# Patient Record
Sex: FEMALE | Race: WHITE | NOT HISPANIC OR LATINO
[De-identification: names, ages, dates, MRNs, and addresses within clinical notes are randomized per-mention and may not be internally consistent; named-entity substitution may affect disease eponyms.]

---

## 2018-11-13 ENCOUNTER — RECORD ABSTRACTING (OUTPATIENT)
Age: 58
End: 2018-11-13

## 2018-11-13 DIAGNOSIS — Z82.0 FAMILY HISTORY OF EPILEPSY AND OTHER DISEASES OF THE NERVOUS SYSTEM: ICD-10-CM

## 2018-11-13 DIAGNOSIS — Z85.3 PERSONAL HISTORY OF MALIGNANT NEOPLASM OF BREAST: ICD-10-CM

## 2018-11-13 DIAGNOSIS — Z82.49 FAMILY HISTORY OF ISCHEMIC HEART DISEASE AND OTHER DISEASES OF THE CIRCULATORY SYSTEM: ICD-10-CM

## 2018-11-13 DIAGNOSIS — Z87.39 PERSONAL HISTORY OF OTHER DISEASES OF THE MUSCULOSKELETAL SYSTEM AND CONNECTIVE TISSUE: ICD-10-CM

## 2018-11-13 DIAGNOSIS — Z84.1 FAMILY HISTORY OF DISORDERS OF KIDNEY AND URETER: ICD-10-CM

## 2018-11-13 DIAGNOSIS — Z87.891 PERSONAL HISTORY OF NICOTINE DEPENDENCE: ICD-10-CM

## 2018-11-13 LAB — CYTOLOGY CVX/VAG DOC THIN PREP: NORMAL

## 2018-11-13 RX ORDER — ASPIRIN 81 MG
600-200 TABLET, DELAYED RELEASE (ENTERIC COATED) ORAL
Refills: 0 | Status: ACTIVE | COMMUNITY

## 2018-11-13 RX ORDER — FEXOFENADINE HCL 60 MG
CAPSULE ORAL
Refills: 0 | Status: ACTIVE | COMMUNITY

## 2018-11-27 ENCOUNTER — APPOINTMENT (OUTPATIENT)
Dept: OBGYN | Facility: CLINIC | Age: 58
End: 2018-11-27

## 2019-01-25 ENCOUNTER — APPOINTMENT (OUTPATIENT)
Dept: OBGYN | Facility: HOSPITAL | Age: 59
End: 2019-01-25

## 2019-02-26 ENCOUNTER — APPOINTMENT (OUTPATIENT)
Dept: OBGYN | Facility: HOSPITAL | Age: 59
End: 2019-02-26

## 2020-05-28 ENCOUNTER — APPOINTMENT (OUTPATIENT)
Dept: NEUROLOGY | Facility: CLINIC | Age: 60
End: 2020-05-28
Payer: COMMERCIAL

## 2020-05-28 DIAGNOSIS — R20.2 PARESTHESIA OF SKIN: ICD-10-CM

## 2020-05-28 PROCEDURE — 99203 OFFICE O/P NEW LOW 30 MIN: CPT | Mod: 95

## 2020-05-28 RX ORDER — TAMOXIFEN CITRATE 10 MG/1
10 TABLET, FILM COATED ORAL
Refills: 0 | Status: DISCONTINUED | COMMUNITY
End: 2020-05-28

## 2020-05-28 NOTE — PHYSICAL EXAM
[General Appearance - Alert] : alert [General Appearance - In No Acute Distress] : in no acute distress [General Appearance - Well-Appearing] : healthy appearing [] : normal voice and communication [Affect] : the affect was normal [Mood] : the mood was normal [Cranial Nerves Oculomotor (III)] : extraocular motion intact [Cranial Nerves Facial (VII)] : face symmetrical [Cranial Nerves Vestibulocochlear (VIII)] : hearing was intact bilaterally [Cranial Nerves Accessory (XI - Cranial And Spinal)] : head turning and shoulder shrug symmetric [Cranial Nerves Hypoglossal (XII)] : there was no tongue deviation with protrusion [Involuntary Movements] : no involuntary movements were seen [Motor Handedness Right-Handed] : the patient is right hand dominant [Abnormal Walk] : normal gait [Paresis Pronator Drift Right-Sided] : no pronator drift on the right [Paresis Pronator Drift Left-Sided] : no pronator drift on the left [FreeTextEntry8] : mild action tremor LUE.

## 2020-05-28 NOTE — HISTORY OF PRESENT ILLNESS
[Home] : at home, [unfilled] , at the time of the visit. [Medical Office: ([facility])___] : at the medical office located in  [Verbal consent obtained from patient] : the patient, [unfilled] [FreeTextEntry1] : Ms Cornell is a 58 y/o right handed woman. She is being evaluated for vertigo. She states that 2 weeks ago she experienced a sudden onset of left facial, shoulder, and arm tingling. There was no weakness or change in her speech. She was able to use her hand normally. This lasted for 2 days and then she returned to normal.\par \par About 4 days ago she began to have episodes of "dizziness". She describes this as a spinning sensation. It would occur episodically and was worse if she turned in bed or moved her head quickly. She also thinks this is worse if she is looking down. She feels no nausea or weakness and her walking is normal.\par She has never had this sensation before.\par \par She has not had any neurologic symptoms before. She was give a prescription for meclizine by her PCP but she has not filled it yet. She notes no change in her hearing and no buzzing or ringing in her ears.

## 2020-05-28 NOTE — ASSESSMENT
[FreeTextEntry1] : 1. Vertigo.\par 2. Paresthesia.\par \par Ms Cornell has had symptoms of left sided tingling and vertigo. I don't think these are related. Her symptom of spinning is typical for vertigo but this should not be related to a transient tingling sensation. She has a slight tremor in her left upper extremity but, otherwise, her limited neurologic exam is normal.\par \par I discussed my findings with her today. Because of her paresthesia I think it is reasonable to obtain a brain MRI and she agrees. I advised her to try taking meclizine as prescribed for her vertigo. I also discussed the use of vestibular therapy and she will consider this if her symptoms persist. I will see her again after the MRI is completed.

## 2020-06-09 DIAGNOSIS — R42 DIZZINESS AND GIDDINESS: ICD-10-CM

## 2020-12-16 ENCOUNTER — APPOINTMENT (OUTPATIENT)
Dept: OBGYN | Facility: CLINIC | Age: 60
End: 2020-12-16
Payer: COMMERCIAL

## 2020-12-16 VITALS
BODY MASS INDEX: 31.49 KG/M2 | SYSTOLIC BLOOD PRESSURE: 126 MMHG | HEIGHT: 65 IN | WEIGHT: 189 LBS | DIASTOLIC BLOOD PRESSURE: 84 MMHG

## 2020-12-16 DIAGNOSIS — Z00.00 ENCOUNTER FOR GENERAL ADULT MEDICAL EXAMINATION W/OUT ABNORMAL FINDINGS: ICD-10-CM

## 2020-12-16 DIAGNOSIS — Z78.0 ASYMPTOMATIC MENOPAUSAL STATE: ICD-10-CM

## 2020-12-16 PROCEDURE — 99396 PREV VISIT EST AGE 40-64: CPT

## 2020-12-16 PROCEDURE — 99072 ADDL SUPL MATRL&STAF TM PHE: CPT

## 2020-12-16 RX ORDER — HYALURONIC ACID, HYDROLYZED 75 TO 100%
POWDER (GRAM) MISCELLANEOUS
Qty: 1 | Refills: 11 | Status: ACTIVE | COMMUNITY
Start: 2020-12-16

## 2020-12-16 NOTE — HISTORY OF PRESENT ILLNESS
[FreeTextEntry1] : 59yo  postmenopausal without HRT, no longer on Tamoxifen here for Gyn exam. Pt referred by Dr. Law. She is S/P bilateral mastectomies (left Ca/right prophylactic) for breast Ca. She had BRCA testing-> NEGATIVE. She had been on Tamoxifen x >10 years-had an endometrial polyp -> hysteroscopic polypectomy-> benign.She still has c/o vaginal dryness and dyspareunia- tried Astroglide and Coconut oil without relief. \par Son had COVID, patient did not. Recently(for the last month) has had diarrhea. Has been improving(happening less often) is awaiting results of another COVID test\par  \par  [Mammogramdate] : Bilateral mastectomies [PapSmeardate] : 4/12/17 [TextBox_31] : Neg/HPV Neg [ColonoscopyDate] : 2015 [TextBox_43] : negative,tortuous colon

## 2020-12-21 LAB
CYTOLOGY CVX/VAG DOC THIN PREP: ABNORMAL
HPV HIGH+LOW RISK DNA PNL CVX: NOT DETECTED

## 2022-11-01 PROBLEM — N95.2 POSTMENOPAUSAL ATROPHIC VAGINITIS: Status: ACTIVE | Noted: 2020-12-16

## 2022-11-01 PROBLEM — M85.88 OSTEOPENIA OF LUMBAR SPINE: Status: ACTIVE | Noted: 2022-11-01

## 2022-11-03 ENCOUNTER — NON-APPOINTMENT (OUTPATIENT)
Age: 62
End: 2022-11-03

## 2022-11-03 ENCOUNTER — APPOINTMENT (OUTPATIENT)
Dept: OBGYN | Facility: CLINIC | Age: 62
End: 2022-11-03

## 2022-11-03 VITALS
DIASTOLIC BLOOD PRESSURE: 70 MMHG | BODY MASS INDEX: 30.99 KG/M2 | SYSTOLIC BLOOD PRESSURE: 124 MMHG | HEIGHT: 65 IN | WEIGHT: 186 LBS

## 2022-11-03 DIAGNOSIS — Z80.3 FAMILY HISTORY OF MALIGNANT NEOPLASM OF BREAST: ICD-10-CM

## 2022-11-03 DIAGNOSIS — M85.88 OTHER SPECIFIED DISORDERS OF BONE DENSITY AND STRUCTURE, OTHER SITE: ICD-10-CM

## 2022-11-03 DIAGNOSIS — N95.2 POSTMENOPAUSAL ATROPHIC VAGINITIS: ICD-10-CM

## 2022-11-03 LAB
BILIRUB UR QL STRIP: NEGATIVE
CLARITY UR: NORMAL
COLLECTION METHOD: NORMAL
GLUCOSE UR-MCNC: NEGATIVE
HCG UR QL: 0.2 EU/DL
HGB UR QL STRIP.AUTO: NEGATIVE
KETONES UR-MCNC: NEGATIVE
LEUKOCYTE ESTERASE UR QL STRIP: NEGATIVE
NITRITE UR QL STRIP: NEGATIVE
PH UR STRIP: 6.5
PROT UR STRIP-MCNC: NEGATIVE
SP GR UR STRIP: 1.02

## 2022-11-03 PROCEDURE — 99396 PREV VISIT EST AGE 40-64: CPT

## 2022-11-03 PROCEDURE — 81003 URINALYSIS AUTO W/O SCOPE: CPT | Mod: QW

## 2022-11-03 NOTE — HISTORY OF PRESENT ILLNESS
[Patient reported colonoscopy was normal] : Patient reported colonoscopy was normal [TextBox_4] :  - 2 NVD's, with a history of carcinoma of the left breast, S/P bilateral mastectomies in 2007.  BRCA mutation negative.  Updated testing was recommended - pt may want to discuss w/ Dr. Roblero, as well. Pt declines referral. Completed 10 years of tamoxifen.  2019 hysteroscopic removal of a benign endometrial polyp.  Atrophic vaginitis not responding adequately to Ismael glide and coconut oil. U/A at PCP's office 4-6 wks ago was neg.\par Good bladder control; voids 1 x/nt.\par Diet & exercise d/w pt who prefers to wait another yr for repeat BD. [TextBox_19] : Bilateral mastectomies [PapSmeardate] : 12/16/2020 [TextBox_31] : neg; HPV neg [BoneDensityDate] : 1/8/2021 [TextBox_37] : T -1.5 at L1-4 representing a 9.1% decrease compared to the prior study on 11/16/2016; T score was not provided for the left hip or left femoral neck.  10-year fracture risk 6.2 and 0.2%. [ColonoscopyDate] : 2013 [TextBox_43] : 2021 neg Peterson. [TextBox_6] : No vasomotor sx's

## 2022-11-03 NOTE — PHYSICAL EXAM
[No Lymphadenopathy] : no lymphadenopathy [Soft] : soft [Non-tender] : non-tender [No HSM] : No HSM [No Mass] : no mass [Nl Sphincter Tone] : normal sphincter tone [FreeTextEntry3] : No thyroid nodules [Right Breast Absent] : a total mastectomy [Breast Reconstruction Right] : breast reconstruction [Left Breast Absent] : a total mastectomy [Breast Reconstruction Left] : breast reconstruction [Vulvar Atrophy] : vulvar atrophy [Labia Majora] : normal [Labia Minora] : normal [Atrophy] : atrophy [Normal] : normal [Normal Position] : in a normal position [Uterine Adnexae] : normal [FreeTextEntry6] : small [FreeTextEntry9] : No masses

## 2023-04-27 ENCOUNTER — APPOINTMENT (OUTPATIENT)
Dept: OBGYN | Facility: CLINIC | Age: 63
End: 2023-04-27
Payer: COMMERCIAL

## 2023-04-27 VITALS
SYSTOLIC BLOOD PRESSURE: 121 MMHG | DIASTOLIC BLOOD PRESSURE: 80 MMHG | WEIGHT: 190 LBS | BODY MASS INDEX: 31.65 KG/M2 | HEIGHT: 65 IN

## 2023-04-27 DIAGNOSIS — R14.0 ABDOMINAL DISTENSION (GASEOUS): ICD-10-CM

## 2023-04-27 PROCEDURE — 76830 TRANSVAGINAL US NON-OB: CPT

## 2023-04-27 PROCEDURE — 99214 OFFICE O/P EST MOD 30 MIN: CPT | Mod: 25

## 2023-04-27 NOTE — REVIEW OF SYSTEMS
[Recent Weight Gain (___ Lbs)] : recent [unfilled] ~Ulb weight gain [Diarrhea] : diarrhea [Heartburn] : heartburn [Bloating] : bloating [Arthralgias] : arthralgias [Negative] : Heme/Lymph 31-Aug-2017

## 2023-04-27 NOTE — HISTORY OF PRESENT ILLNESS
[FreeTextEntry1] : 63yo  postmenopausal without HRT, no longer on Tamoxifen here for Gyn exam. Pt followed by Dr. Law. She is S/P bilateral mastectomies (left Ca/right prophylactic) for breast Ca. She had BRCA testing-> NEGATIVE. She had been on Tamoxifen x >10 years-had an endometrial polyp -> hysteroscopic polypectomy-> benign.She has c/o abdominal bloating. She also has perineal and perianal irritation(has to wipe a lot then feels raw) Is getting  so dyspareunia is not an active problem at this time\par \par  \par \par \par Preventative Visit: \par Mammogram: Bilateral mastectomies. \par PAP Smear: 20  Neg/HPV Neg. \par Bone Density: 21 T Score Spine -1.5 Hip +0.1 Fem Neck -0.7 Forearm -1.9\par Colonoscopy: , negative,tortuous colon. \par

## 2023-11-09 ENCOUNTER — APPOINTMENT (OUTPATIENT)
Dept: OBGYN | Facility: CLINIC | Age: 63
End: 2023-11-09

## 2024-04-11 ENCOUNTER — APPOINTMENT (OUTPATIENT)
Dept: UROLOGY | Facility: CLINIC | Age: 64
End: 2024-04-11
Payer: COMMERCIAL

## 2024-04-11 VITALS
DIASTOLIC BLOOD PRESSURE: 84 MMHG | HEIGHT: 65 IN | HEART RATE: 79 BPM | BODY MASS INDEX: 32.49 KG/M2 | SYSTOLIC BLOOD PRESSURE: 145 MMHG | WEIGHT: 195 LBS

## 2024-04-11 DIAGNOSIS — L98.9 DISORDER OF THE SKIN AND SUBCUTANEOUS TISSUE, UNSPECIFIED: ICD-10-CM

## 2024-04-11 DIAGNOSIS — Z85.3 PERSONAL HISTORY OF MALIGNANT NEOPLASM OF BREAST: ICD-10-CM

## 2024-04-11 DIAGNOSIS — M54.50 LOW BACK PAIN, UNSPECIFIED: ICD-10-CM

## 2024-04-11 PROCEDURE — 99203 OFFICE O/P NEW LOW 30 MIN: CPT

## 2024-04-11 RX ORDER — CLONAZEPAM 0.5 MG/1
0.5 TABLET ORAL
Refills: 0 | Status: DISCONTINUED | COMMUNITY
End: 2024-04-11

## 2024-04-11 NOTE — ASSESSMENT
[FreeTextEntry1] : Patient is a 63-year-old female who has intermittent frequency urgency and suprapubic bloating.  Her PCP has checked her for UTIs and she has come back negative.  She has no obvious modifying factors.  She denies any episodes of gross hematuria or fevers associated with these episodes.  She is a non-smoker.  Assessment and plan 1.  Suprapubic pressure and pain 2.  UTI symptoms Will start by getting a renal ultrasound, cystoscopy with pelvic exam to evaluate.

## 2024-04-26 ENCOUNTER — APPOINTMENT (OUTPATIENT)
Dept: UROLOGY | Facility: CLINIC | Age: 64
End: 2024-04-26
Payer: COMMERCIAL

## 2024-04-26 VITALS
WEIGHT: 190 LBS | BODY MASS INDEX: 31.65 KG/M2 | DIASTOLIC BLOOD PRESSURE: 98 MMHG | SYSTOLIC BLOOD PRESSURE: 171 MMHG | HEIGHT: 65 IN | HEART RATE: 93 BPM

## 2024-04-26 DIAGNOSIS — R39.9 UNSPECIFIED SYMPTOMS AND SIGNS INVOLVING THE GENITOURINARY SYSTEM: ICD-10-CM

## 2024-04-26 PROCEDURE — 52000 CYSTOURETHROSCOPY: CPT

## 2024-05-01 ENCOUNTER — APPOINTMENT (OUTPATIENT)
Dept: OBGYN | Facility: CLINIC | Age: 64
End: 2024-05-01

## 2024-05-15 PROBLEM — R39.9 UTI SYMPTOMS: Status: ACTIVE | Noted: 2024-05-15

## 2024-05-23 ENCOUNTER — APPOINTMENT (OUTPATIENT)
Dept: OBGYN | Facility: CLINIC | Age: 64
End: 2024-05-23
Payer: COMMERCIAL

## 2024-05-23 VITALS
WEIGHT: 196 LBS | SYSTOLIC BLOOD PRESSURE: 138 MMHG | DIASTOLIC BLOOD PRESSURE: 80 MMHG | BODY MASS INDEX: 32.65 KG/M2 | HEIGHT: 65 IN

## 2024-05-23 DIAGNOSIS — Z01.419 ENCOUNTER FOR GYNECOLOGICAL EXAMINATION (GENERAL) (ROUTINE) W/OUT ABNORMAL FINDINGS: ICD-10-CM

## 2024-05-23 PROCEDURE — 99396 PREV VISIT EST AGE 40-64: CPT

## 2024-05-23 RX ORDER — ESCITALOPRAM OXALATE 10 MG/1
10 TABLET, FILM COATED ORAL
Refills: 0 | Status: ACTIVE | COMMUNITY

## 2024-05-23 NOTE — HISTORY OF PRESENT ILLNESS
[FreeTextEntry1] : 62yo  postmenopausal without HRT, no longer on Tamoxifen here for Gyn exam. Pt followed by Dr. Law. She is S/P bilateral mastectomies (left Ca/right prophylactic) for breast Ca. She had a finding on imaging on right -> MRI guided biopsy-> scar tissue. She has had BRCA testing-> NEGATIVE. She had been on Tamoxifen x >10 years-had an endometrial polyp -> hysteroscopic polypectomy-> benign. She has c/o abdominal bloating and RLQ pain. She will be having CT ordered by Dr. Roblero. She also has perineal and perianal irritation(has to wipe a lot then feels raw) Divorce is final, not sexually active.          Preventative Visit:  Mammogram: Bilateral mastectomies.  PAP Smear: 20 Neg/HPV Neg.  Bone Density: 21 T Score Spine -1.5 Hip +0.1 Fem Neck -0.7 Forearm -1.9  Colonoscopy: , negative,tortuous colon.

## 2024-05-28 LAB — HPV HIGH+LOW RISK DNA PNL CVX: NOT DETECTED

## 2024-05-30 LAB — CYTOLOGY CVX/VAG DOC THIN PREP: ABNORMAL

## 2024-07-18 ENCOUNTER — TRANSCRIPTION ENCOUNTER (OUTPATIENT)
Age: 64
End: 2024-07-18

## 2024-08-08 ENCOUNTER — APPOINTMENT (OUTPATIENT)
Dept: UROLOGY | Facility: CLINIC | Age: 64
End: 2024-08-08

## 2025-06-18 ENCOUNTER — APPOINTMENT (OUTPATIENT)
Dept: OBGYN | Facility: CLINIC | Age: 65
End: 2025-06-18
Payer: COMMERCIAL

## 2025-06-18 ENCOUNTER — NON-APPOINTMENT (OUTPATIENT)
Age: 65
End: 2025-06-18

## 2025-06-18 VITALS
BODY MASS INDEX: 33.82 KG/M2 | WEIGHT: 203 LBS | DIASTOLIC BLOOD PRESSURE: 90 MMHG | SYSTOLIC BLOOD PRESSURE: 140 MMHG | HEIGHT: 65 IN

## 2025-06-18 PROCEDURE — 99396 PREV VISIT EST AGE 40-64: CPT
